# Patient Record
Sex: FEMALE | Race: WHITE
[De-identification: names, ages, dates, MRNs, and addresses within clinical notes are randomized per-mention and may not be internally consistent; named-entity substitution may affect disease eponyms.]

---

## 2022-05-25 ENCOUNTER — HOSPITAL ENCOUNTER (OUTPATIENT)
Dept: HOSPITAL 47 - FBPOP | Age: 42
Discharge: HOME | End: 2022-05-25
Attending: OBSTETRICS & GYNECOLOGY
Payer: COMMERCIAL

## 2022-05-25 DIAGNOSIS — Z91.040: ICD-10-CM

## 2022-05-25 DIAGNOSIS — Z3A.34: ICD-10-CM

## 2022-05-25 DIAGNOSIS — Z88.5: ICD-10-CM

## 2022-05-25 DIAGNOSIS — Z88.1: ICD-10-CM

## 2022-05-25 DIAGNOSIS — Z87.51: ICD-10-CM

## 2022-05-25 DIAGNOSIS — O60.03: Primary | ICD-10-CM

## 2022-05-25 DIAGNOSIS — Z88.6: ICD-10-CM

## 2022-05-25 PROCEDURE — 96372 THER/PROPH/DIAG INJ SC/IM: CPT

## 2022-05-26 ENCOUNTER — HOSPITAL ENCOUNTER (OUTPATIENT)
Dept: HOSPITAL 47 - FBPOP | Age: 42
End: 2022-05-26
Attending: OBSTETRICS & GYNECOLOGY
Payer: COMMERCIAL

## 2022-05-26 DIAGNOSIS — O60.03: Primary | ICD-10-CM

## 2022-05-26 DIAGNOSIS — Z87.51: ICD-10-CM

## 2022-05-26 DIAGNOSIS — Z88.1: ICD-10-CM

## 2022-05-26 DIAGNOSIS — Z88.5: ICD-10-CM

## 2022-05-26 DIAGNOSIS — Z3A.34: ICD-10-CM

## 2022-05-26 DIAGNOSIS — Z88.6: ICD-10-CM

## 2022-05-26 DIAGNOSIS — Z91.040: ICD-10-CM

## 2022-05-26 PROCEDURE — 96372 THER/PROPH/DIAG INJ SC/IM: CPT

## 2022-05-29 ENCOUNTER — HOSPITAL ENCOUNTER (OUTPATIENT)
Dept: HOSPITAL 47 - FBPOP | Age: 42
End: 2022-05-29
Attending: OBSTETRICS & GYNECOLOGY
Payer: COMMERCIAL

## 2022-05-29 VITALS
RESPIRATION RATE: 16 BRPM | HEART RATE: 94 BPM | DIASTOLIC BLOOD PRESSURE: 58 MMHG | SYSTOLIC BLOOD PRESSURE: 126 MMHG | TEMPERATURE: 97.2 F

## 2022-05-29 DIAGNOSIS — Z88.6: ICD-10-CM

## 2022-05-29 DIAGNOSIS — O47.03: Primary | ICD-10-CM

## 2022-05-29 DIAGNOSIS — Z3A.35: ICD-10-CM

## 2022-05-29 DIAGNOSIS — Z91.040: ICD-10-CM

## 2022-05-29 DIAGNOSIS — Z88.5: ICD-10-CM

## 2022-05-29 DIAGNOSIS — Z88.1: ICD-10-CM

## 2022-05-29 DIAGNOSIS — Z88.8: ICD-10-CM

## 2022-05-29 PROCEDURE — 59025 FETAL NON-STRESS TEST: CPT

## 2022-05-29 PROCEDURE — 99213 OFFICE O/P EST LOW 20 MIN: CPT

## 2022-05-30 NOTE — P.MSEPDOC
Presenting Problems





- Arrival Data


Date of Arrival on Unit: 22


Time of Arrival on Unit: 13:15


Mode of Transport: Ambulatory





- Complaint


OB-Reason for Admission/Chief Complaint: Possible Onset of Labor


Comment: contractions off and on for 2 days





Prenatal Medical History





- Pregnancy Information


: 7


Para: 3


Term: 0


: 3


Abortions: Spontaneous or Elective: 3


Number of Living Children: 3





- Gestational Age


Gestational Age by ODILIA (wks/days): 35 Weeks and 1 Days





- Prenatal History


Pregnancy Complications: Prior 





Review of Systems





- Review of Systems


Constitutional: No problems


Breast: No problems


ENT: No problems


Cardiovascular: No problems


Respiratory: No problems


Gastrointestinal: No problems


Genitourinary: No problems


Musculoskeletal: No problems


Neurological: No problems


Skin: No problems





Vital Signs





- Temperature


Temperature: 97.2 F


Temperature Source: Temporal Artery Scan





- Pulse


  ** Right


Pulse Rate: 94


Pulse Assessment Method: Automatic Cuff





- Respirations


Respiratory Rate: 16


Oxygen Delivery Method: Room Air


O2 Sat by Pulse Oximetry: 99





- Blood Pressure


  ** Right Arm Sitting


Blood Pressure: 126/58


Blood Pressure Mean: 80


Blood Pressure Source: Automatic Cuff





Medical Screen Scoring





- Cervical Exam


Dilation (cm): 2.5


Effacement (%): 0


Station: -3


Membranes: Intact





- Uterine Contractions


Frequency From (mins): 4


Frequency To (mins): 10


Duration From (seconds): 80


Duration To (seconds): 90


Intensity: Mild


Resting: Soft to palpation





- Fetal Assessment - Baby A


Baseline FHR: 120


Fetal Heart Rate - NICHD Category: Category I (Normal)


NST: Reactive





Physician Notification





- Physician Notified


Physician Notified Date: 22


Physician Notified Time: 14:40


Physician: Soha Zambrano Order Received: Yes (discharge with instruction)





- Notification Comment


Comment: oral hydration, pelvic rest, follow up in office Wed





Maternal Fetal Triage Index





- Prompt/Priority 3


Prompt Priority 3: Yes


Criteria Met for Priority 3: irregular contractions, no cervical changed over  

hour, pt coping well, 35 





Disposition





- Disposition


OB Disposition: Physician follow up in office, Triage, Written follow up 

instructions reviewed


Discharge Date: 22


Discharge Time: 14:45


I agree with the RN Medical Screening Exam: Yes


Case reviewed; plan agreed upon as documented in EMR&OBIX.: Yes


Diagnosis: FALSE LABOR BEFORE 37 COMPLETED WEEKS OF GEST, THIRD TRI

## 2022-05-30 NOTE — P.MSEPDOC
Presenting Problems





- Arrival Data


Date of Arrival on Unit: 22


Time of Arrival on Unit: 13:15


Mode of Transport: Ambulatory





- Complaint


OB-Reason for Admission/Chief Complaint: Possible Onset of Labor


Comment: contractions off and on for 2 days





Prenatal Medical History





- Pregnancy Information


: 7


Para: 3


Term: 0


: 3


Abortions: Spontaneous or Elective: 3


Number of Living Children: 3





- Gestational Age


Gestational Age by ODILIA (wks/days): 35 Weeks and 1 Days





- Prenatal History


Pregnancy Complications: Prior 





Review of Systems





- Review of Systems


Constitutional: No problems


Breast: No problems


ENT: No problems


Cardiovascular: No problems


Respiratory: No problems


Gastrointestinal: No problems


Genitourinary: No problems


Musculoskeletal: No problems


Neurological: No problems


Skin: No problems





Vital Signs





- Temperature


Temperature: 97.2 F


Temperature Source: Temporal Artery Scan





- Pulse


  ** Right


Pulse Rate: 94


Pulse Assessment Method: Automatic Cuff





- Respirations


Respiratory Rate: 16


Oxygen Delivery Method: Room Air


O2 Sat by Pulse Oximetry: 99





- Blood Pressure


  ** Right Arm Sitting


Blood Pressure: 126/58


Blood Pressure Mean: 80


Blood Pressure Source: Automatic Cuff





Medical Screen Scoring





- Cervical Exam


Dilation (cm): 2.5


Effacement (%): 0


Station: -3


Membranes: Intact





- Uterine Contractions


Frequency From (mins): 4


Frequency To (mins): 10


Duration From (seconds): 80


Duration To (seconds): 90


Intensity: Mild


Resting: Soft to palpation





- Fetal Assessment - Baby A


Baseline FHR: 120


Fetal Heart Rate - NICHD Category: Category I (Normal)


NST: Reactive





Physician Notification





- Physician Notified


Physician Notified Date: 22


Physician Notified Time: 14:40


Physician: Soha Zambrano Order Received: Yes (discharge with instruction)





- Notification Comment


Comment: oral hydration, pelvic rest, follow up in office Wed





Maternal Fetal Triage Index





- Prompt/Priority 3


Prompt Priority 3: Yes


Criteria Met for Priority 3: irregular contractions, no cervical changed over  

hour, pt coping well, 35 





Disposition





- Disposition


OB Disposition: Triage, Written follow up instructions reviewed


Discharge Date: 22


Discharge Time: 14:45


I agree with the RN Medical Screening Exam: Yes


Case reviewed; plan agreed upon as documented in EMR&OBIX.: Yes


Diagnosis: FALSE LABOR BEFORE 37 COMPLETED WEEKS OF GEST, THIRD TRI

## 2022-06-01 ENCOUNTER — HOSPITAL ENCOUNTER (INPATIENT)
Dept: HOSPITAL 47 - FBPOP | Age: 42
LOS: 3 days | Discharge: HOME | End: 2022-06-04
Attending: OBSTETRICS & GYNECOLOGY | Admitting: OBSTETRICS & GYNECOLOGY
Payer: COMMERCIAL

## 2022-06-01 DIAGNOSIS — Z82.49: ICD-10-CM

## 2022-06-01 DIAGNOSIS — O09.523: ICD-10-CM

## 2022-06-01 DIAGNOSIS — Z3A.36: ICD-10-CM

## 2022-06-01 PROCEDURE — 59025 FETAL NON-STRESS TEST: CPT

## 2022-06-01 PROCEDURE — 99213 OFFICE O/P EST LOW 20 MIN: CPT

## 2022-06-01 PROCEDURE — 86850 RBC ANTIBODY SCREEN: CPT

## 2022-06-01 PROCEDURE — 88307 TISSUE EXAM BY PATHOLOGIST: CPT

## 2022-06-01 PROCEDURE — 86900 BLOOD TYPING SEROLOGIC ABO: CPT

## 2022-06-01 PROCEDURE — 85025 COMPLETE CBC W/AUTO DIFF WBC: CPT

## 2022-06-01 PROCEDURE — 86901 BLOOD TYPING SEROLOGIC RH(D): CPT

## 2022-06-02 LAB
BASOPHILS # BLD AUTO: 0.1 K/UL (ref 0–0.2)
BASOPHILS NFR BLD AUTO: 0 %
EOSINOPHIL # BLD AUTO: 0.1 K/UL (ref 0–0.7)
EOSINOPHIL NFR BLD AUTO: 1 %
ERYTHROCYTE [DISTWIDTH] IN BLOOD BY AUTOMATED COUNT: 4.52 M/UL (ref 3.8–5.4)
ERYTHROCYTE [DISTWIDTH] IN BLOOD: 15.4 % (ref 11.5–15.5)
HCT VFR BLD AUTO: 33 % (ref 34–46)
HGB BLD-MCNC: 9.6 GM/DL (ref 11.4–16)
LYMPHOCYTES # SPEC AUTO: 2.9 K/UL (ref 1–4.8)
LYMPHOCYTES NFR SPEC AUTO: 17 %
MCH RBC QN AUTO: 21.3 PG (ref 25–35)
MCHC RBC AUTO-ENTMCNC: 29.2 G/DL (ref 31–37)
MCV RBC AUTO: 73 FL (ref 80–100)
MONOCYTES # BLD AUTO: 0.9 K/UL (ref 0–1)
MONOCYTES NFR BLD AUTO: 6 %
NEUTROPHILS # BLD AUTO: 12.7 K/UL (ref 1.3–7.7)
NEUTROPHILS NFR BLD AUTO: 75 %
PLATELET # BLD AUTO: 358 K/UL (ref 150–450)
WBC # BLD AUTO: 17 K/UL (ref 3.8–10.6)

## 2022-06-02 RX ADMIN — POTASSIUM CHLORIDE SCH MLS/HR: 14.9 INJECTION, SOLUTION INTRAVENOUS at 01:15

## 2022-06-02 RX ADMIN — OXYTOCIN-SODIUM CHLORIDE 0.9% IV SOLN 30 UNIT/500ML SCH MLS/HR: 30-0.9/5 SOLUTION at 08:20

## 2022-06-02 RX ADMIN — IBUPROFEN PRN MG: 600 TABLET ORAL at 22:11

## 2022-06-02 RX ADMIN — POTASSIUM CHLORIDE SCH MLS/HR: 14.9 INJECTION, SOLUTION INTRAVENOUS at 12:18

## 2022-06-02 RX ADMIN — OXYTOCIN-SODIUM CHLORIDE 0.9% IV SOLN 30 UNIT/500ML SCH MLS/HR: 30-0.9/5 SOLUTION at 17:29

## 2022-06-02 RX ADMIN — DOCUSATE SODIUM AND SENNOSIDES SCH EACH: 50; 8.6 TABLET ORAL at 20:16

## 2022-06-02 RX ADMIN — POTASSIUM CHLORIDE SCH MLS/HR: 14.9 INJECTION, SOLUTION INTRAVENOUS at 04:30

## 2022-06-02 RX ADMIN — IBUPROFEN PRN MG: 600 TABLET ORAL at 16:41

## 2022-06-02 RX ADMIN — POTASSIUM CHLORIDE SCH MLS/HR: 14.9 INJECTION, SOLUTION INTRAVENOUS at 00:21

## 2022-06-02 NOTE — P.HPOB
History of Present Illness


H&P Date: 22


Chief Complaint: 35-5/7 weeks,  labor with active change





The patient is a 41-year-old  7 para 0333 presented to triage with active

and uncomfortable contractions at 35-5/7 weeks as established by last menstrual 

period and confirmed by 27 week ultrasound.  She was initially found to be 3 cm 

dilated in triage but made cervical change over the course of time despite no 

significant obvious contraction pattern.  She does have a history of 3 previous 

36 week  deliveries.  She additionally has had a number of recent visits 

to triage and received betamethasone at 24 intervals last week.  Her pregnancy 

has otherwise been essentially uncomplicated.  She did fall into the category of

advanced maternal age and did not have  trisomy testing.  She had 

initial part of her prenatal care performed in [facility] where she resides with 

her  return to [place] to deliver her baby.  Prenatal care in our 

office beginning at approximately 27 weeks.  Group B strep status is negative.  

On labor and delivery, all signs reassuring with a category 1 fetal heart rate 

tracing.  Given her change in triage, she is admitted but not actively managed. 

She continued to make cervical change and had discomfort and an epidural was 

placed for analgesia.  She is not committed to delivery.





Obstetrical history:  7 para 0333 with 336 week vaginal deliveries as 

noted above.  She also had 3 early miscarriages.  Current pregnancy statistics 

are listed in history present illness.  EDC of 2022 was established by 

last menstrual period and confirmed by 27 week ultrasound.  Laboratory workup 

demonstrates a blood type of B+ with a negative antibody screen.  Rubella status

is immune.  The remainder of laboratory workup was within normal limits.  One 

hour Glucola was normal and group B strep status is negative.





Gynecologic history: Unremarkable with no history of any infections to include 

STDs.





Review of Systems





Review of systems is confined to history of present illness.





Past Medical History


Past Medical History: No Reported History


History of Any Multi-Drug Resistant Organisms: None Reported


Past Surgical History: Appendectomy


Additional Past Surgical History / Comment(s): D&C


Past Anesthesia/Blood Transfusion Reactions: No Reported Reaction


Past Psychological History: No Psychological Hx Reported


Smoking Status: Never smoker


Past Alcohol Use History: None Reported


Past Drug Use History: None Reported





- Past Family History


  ** Mother


Family Medical History: Hypertension





Medications and Allergies


                                Home Medications











 Medication  Instructions  Recorded  Confirmed  Type


 


No Known Home Medications  16 History








                                    Allergies











Allergy/AdvReac Type Severity Reaction Status Date / Time


 


acetaminophen [From Percocet] Allergy  Unknown Verified 22 21:33


 


Latex, Natural Rubber Allergy  Swelling Verified 22 21:33


 


oxycodone [From Percocet] Allergy  Unknown Verified 22 21:33


 


terbutaline Allergy  Anaphylaxis Verified 22 21:59


 


doxycycline AdvReac  Nausea & Verified 22 21:33





   Vomiting  


 


naproxen AdvReac  Nausea & Verified 22 21:33





   Vomiting  














Exam


                                   Vital Signs











  Temp Pulse BP Pulse Ox


 


 22 21:32  97.9 F  113 H  131/62  98








                                Intake and Output











 22





 22:59 06:59 14:59


 


Output Total  900 


 


Balance  -900 


 


Output:   


 


  Urine  900 


 


Other:   


 


  Weight 90.265 kg 90.265 kg 














In general, this is a moderately obese white female in no acute distress.  Her 

heart has a regular rhythm and rate without murmur.  Her lungs clear to 

auscultation bilaterally in all fields.  Her abdomen is moderately obese, 

gravid, nondistended, has normal active bowel sounds, soft, nontender, and 

without any palpable masses aside from uterine fundus.  Her extremities are 

without any cyanosis, clubbing, or edema and are nontender to palpation 

bilaterally.  Digital cervical examination on straights her cervix to be 

approximately 5 cm dilated, relatively thick, the vertex in presentation at -3 

station.  It is a relatively floppy multiparous cervix.





Results


Result Diagrams: 


                                 22 00:16





                  Abnormal Lab Results - Last 24 Hours (Table)











  22 Range/Units





  00:16 


 


WBC  17.0 H  (3.8-10.6)  k/uL


 


Hgb  9.6 L  (11.4-16.0)  gm/dL


 


Hct  33.0 L  (34.0-46.0)  %


 


MCV  73.0 L  (80.0-100.0)  fL


 


MCH  21.3 L  (25.0-35.0)  pg


 


MCHC  29.2 L  (31.0-37.0)  g/dL


 


Neutrophils #  12.7 H  (1.3-7.7)  k/uL














Assessment and Plan


(1) Active  labor


Current Visit: Yes   Status: Acute   Code(s): O60.10X0 -  LABOR W 

DELIVERY, UNSP TRIMESTER, UNSP   SNOMED Code(s): 6512003


   





(2) 35 to 36 weeks gestation of pregnancy


Current Visit: Yes   Status: Acute   Code(s): RFG0280 -    SNOMED Code(s): 

233207948


   


Plan: 





Given her cervical change in triage, she was admitted for observation.  She 

continued to make change and therefore had an epidural catheter placed for 

analgesia which commits her to labor.  This morning her cervix could be deemed 

anywhere from 4-6 cm the.  I will institute Pitocin augmentation and, one 

possible proceed with artificial rupture of membranes.  I would anticipate 

normal spontaneous vaginal delivery.  The patient will continue to have close 

maternal and  fetal surveillance and expectant management will be practiced.

## 2022-06-03 VITALS — RESPIRATION RATE: 16 BRPM

## 2022-06-03 LAB
BASOPHILS # BLD AUTO: 0 K/UL (ref 0–0.2)
BASOPHILS NFR BLD AUTO: 0 %
EOSINOPHIL # BLD AUTO: 0.1 K/UL (ref 0–0.7)
EOSINOPHIL NFR BLD AUTO: 1 %
ERYTHROCYTE [DISTWIDTH] IN BLOOD BY AUTOMATED COUNT: 3.35 M/UL (ref 3.8–5.4)
ERYTHROCYTE [DISTWIDTH] IN BLOOD: 16 % (ref 11.5–15.5)
HCT VFR BLD AUTO: 24 % (ref 34–46)
HGB BLD-MCNC: 7.2 GM/DL (ref 11.4–16)
LYMPHOCYTES # SPEC AUTO: 2.2 K/UL (ref 1–4.8)
LYMPHOCYTES NFR SPEC AUTO: 14 %
MCH RBC QN AUTO: 21.6 PG (ref 25–35)
MCHC RBC AUTO-ENTMCNC: 30.1 G/DL (ref 31–37)
MCV RBC AUTO: 71.7 FL (ref 80–100)
MONOCYTES # BLD AUTO: 0.8 K/UL (ref 0–1)
MONOCYTES NFR BLD AUTO: 5 %
NEUTROPHILS # BLD AUTO: 12.3 K/UL (ref 1.3–7.7)
NEUTROPHILS NFR BLD AUTO: 78 %
PLATELET # BLD AUTO: 306 K/UL (ref 150–450)
WBC # BLD AUTO: 15.7 K/UL (ref 3.8–10.6)

## 2022-06-03 RX ADMIN — DOCUSATE SODIUM AND SENNOSIDES SCH EACH: 50; 8.6 TABLET ORAL at 07:44

## 2022-06-03 RX ADMIN — IBUPROFEN PRN MG: 600 TABLET ORAL at 03:15

## 2022-06-03 RX ADMIN — IBUPROFEN PRN MG: 600 TABLET ORAL at 22:44

## 2022-06-03 RX ADMIN — IBUPROFEN PRN MG: 600 TABLET ORAL at 08:45

## 2022-06-03 RX ADMIN — IBUPROFEN PRN MG: 600 TABLET ORAL at 16:41

## 2022-06-03 NOTE — P.PNOBGVD
Subjective





- Subjective


Patient reports: Reports appetite normal, Reports voiding normally, Reports pain

well controlled, Reports ambulating normally


[place]: doing well, in NICU (Secondary to gestational age.)





Objective





- Latest Vital Signs


Latest vital signs: 


                                   Vital Signs











  Temp Pulse Resp BP Pulse Ox


 


 22 08:00  97.8 F  86  16  125/73 


 


 22 04:00  98.0 F  80  15  102/63  99


 


 22 00:00  98.4 F  86  15  113/60 


 


 22 20:00  97.7 F  81  15  119/64 


 


 22 17:41  98.0 F  72  16  125/58 


 


 22 17:12  98.0 F  71  16  125/68 


 


 22 16:42   82  16  120/60 


 


 22 16:27   85  16  118/60 


 


 22 16:12   85  16  118/60 


 


 22 16:00  98.0 F  86  16  128/62 


 


 22 15:57   84  16  139/61 


 


 22 15:42  98.0 F  88  16  153/68 








                                Intake and Output











 22





 22:59 06:59 14:59


 


Intake Total 9.15  


 


Output Total 200  


 


Balance -190.85  


 


Intake:   


 


  Intake, IV Titration 9.15  





  Amount   


 


    Oxytocin 30 Units/500 ml 9.15  





    Ns 30 unit In Saline 1   





    500ml.bag @ Per Protocol   





    IV .Q0M [facility] Rx#:[id number]   


 


Output:   


 


  Urine 0  


 


  Output, Quantitative 200  





  Blood Loss   


 


Other:   


 


  # Voids  2 2














- Exam


Extremities: Present: normal


Abdomen: Present: normal appearance, soft


Uterus: Present: normal, firm (The uterine fundus systolic and nontender just 

below the umbilicus.)





- Labs


Labs: 


                  Abnormal Lab Results - Last 24 Hours (Table)











  22 Range/Units





  06:30 


 


WBC  15.7 H  (3.8-10.6)  k/uL


 


RBC  3.35 L  (3.80-5.40)  m/uL


 


Hgb  7.2 L D  (11.4-16.0)  gm/dL


 


Hct  24.0 L  (34.0-46.0)  %


 


MCV  71.7 L  (80.0-100.0)  fL


 


MCH  21.6 L  (25.0-35.0)  pg


 


MCHC  30.1 L  (31.0-37.0)  g/dL


 


RDW  16.0 H  (11.5-15.5)  %


 


Neutrophils #  12.3 H  (1.3-7.7)  k/uL














Assessment and Plan


(1) Active  labor


Current Visit: Yes   Status: Acute   Code(s): O60.10X0 -  LABOR W 

DELIVERY, UNSP TRIMESTER, UNSP   SNOMED Code(s): 8848182


   





(2) 35 to 36 weeks gestation of pregnancy


Current Visit: Yes   Status: Acute   Code(s): JUS5562 -    SNOMED Code(s): 

135320640


   





(3) Normal spontaneous vaginal delivery


Current Visit: Yes   Status: Acute   Code(s): O80 - ENCOUNTER FOR FULL-TERM 

UNCOMPLICATED DELIVERY   SNOMED Code(s): 31397376


   


Plan: 





Continue routine postpartum care.  I would anticipate discharge home tomorrow 

pending no complications.  It is unclear the length of stay for the infant.

## 2022-06-04 VITALS — TEMPERATURE: 98.1 F | DIASTOLIC BLOOD PRESSURE: 65 MMHG | HEART RATE: 78 BPM | SYSTOLIC BLOOD PRESSURE: 105 MMHG

## 2022-06-04 RX ADMIN — IBUPROFEN PRN MG: 600 TABLET ORAL at 08:15

## 2022-06-04 RX ADMIN — DOCUSATE SODIUM AND SENNOSIDES SCH: 50; 8.6 TABLET ORAL at 04:47

## 2022-06-04 RX ADMIN — DOCUSATE SODIUM AND SENNOSIDES SCH: 50; 8.6 TABLET ORAL at 12:56

## 2022-06-04 NOTE — P.DS
Providers


Date of admission: 


22 00:03





Expected date of discharge: 22


Attending physician: 


Peter Dukes





Primary care physician: 


Stated None








- Discharge Diagnosis(es)


(1) 35 to 36 weeks gestation of pregnancy


Current Visit: Yes   Status: Acute   





(2) Active  labor


Current Visit: Yes   Status: Acute   





(3) Advanced maternal age (AMA) in pregnancy


Current Visit: Yes   Status: Acute   





(4) Normal spontaneous vaginal delivery


Current Visit: Yes   Status: Acute   


Hospital Course: 





This is a 41-year-old G7 now  that presented to triage with regular painful

contractions at 35 and 5 established by a last once her period and confirmed 

with a 27 week ultrasound.  Patient was initially 3 cm in triage but made 

cervical change over the course of time despite not having an obvious 

contraction pattern.  Patient does have a history of 3 previous 36 week  

deliveries.  Patient has had been seen numerous times throughout triage and 

received betamethasone last week.  Her pregnancy has been uncomplicated.  

Patient is known advanced maternal age and she denied genetic screening.  

Patient states she had her initial prenatal care in [place] where her  

resides and will return denied states after she delivers.  Prenatal care began 

in our office at 27 weeks.  


Patient was admitted to labor and delivery in active labor, patient progressiv

ely labor eventually becoming complete and had a normal spontaneous vaginal 

delivery of a viable male infant at 1536, weight of 6 lbs. 8 oz., Apgars of 8 

and 9 at one and 5 minutes respectively.  Infant was taken to the nursery for 

observation and is currently still admitted to the special care nursery.  On 

this postpartum day #2 the patient is doing well.  She is ambulating and voiding

without difficulty.  She is tolerating a regular diet without nausea or 

vomiting.  She is bottle feeding.  She denies concerns and understands on this 

postpartum day #2 she will be discharged home.


Patient Condition at Discharge: Good





Plan - Discharge Summary


New Discharge Prescriptions: 


No Action


   No Known Home Medications 


Discharge Medication List





No Known Home Medications  16 [History]








Follow up Appointment(s)/Referral(s): 


Peter Dukes MD [STAFF PHYSICIAN] - 6 Weeks


Patient Instructions/Handouts:  Vaginal Delivery (GEN), Vaginal Delivery (DC)


Discharge Disposition: HOME SELF-CARE